# Patient Record
Sex: FEMALE | URBAN - METROPOLITAN AREA
[De-identification: names, ages, dates, MRNs, and addresses within clinical notes are randomized per-mention and may not be internally consistent; named-entity substitution may affect disease eponyms.]

---

## 2022-03-28 ENCOUNTER — REFRACTIVE (OUTPATIENT)
Dept: URBAN - METROPOLITAN AREA CLINIC 37 | Facility: CLINIC | Age: 27
End: 2022-03-28

## 2022-03-28 DIAGNOSIS — H52.13 MYOPIA, BILATERAL: Primary | ICD-10-CM

## 2022-03-28 RX ORDER — OFLOXACIN 3 MG/ML
0.3 % SOLUTION/ DROPS OPHTHALMIC
Qty: 5 | Refills: 0 | Status: ACTIVE
Start: 2022-03-28

## 2022-03-28 RX ORDER — PREDNISOLONE ACETATE 10 MG/ML
1 % SUSPENSION/ DROPS OPHTHALMIC
Qty: 5 | Refills: 0 | Status: ACTIVE
Start: 2022-03-28

## 2022-03-28 ASSESSMENT — VISUAL ACUITY
OD: 20/20
OS: 20/20

## 2022-03-28 ASSESSMENT — KERATOMETRY
OD: 43.55
OS: 43.60

## 2022-03-28 ASSESSMENT — INTRAOCULAR PRESSURE
OS: 17
OD: 17

## 2022-04-06 ENCOUNTER — REFRACTIVE (OUTPATIENT)
Dept: URBAN - METROPOLITAN AREA CLINIC 37 | Facility: CLINIC | Age: 27
End: 2022-04-06

## 2022-04-06 PROCEDURE — LASIK LASIK PRKSURGEON'S FEE: CUSTOM | Performed by: OPHTHALMOLOGY

## 2022-04-06 PROCEDURE — LASIK LASIK SURGEON'S FEE: CUSTOM | Performed by: OPHTHALMOLOGY

## 2022-04-09 ENCOUNTER — POST-OPERATIVE VISIT (OUTPATIENT)
Dept: URBAN - METROPOLITAN AREA CLINIC 10 | Facility: CLINIC | Age: 27
End: 2022-04-09

## 2022-04-09 DIAGNOSIS — Z48.810 ENCOUNTER FOR SURGICAL AFTERCARE FOLLOWING SURGERY ON A SENSE ORGAN: Primary | ICD-10-CM

## 2022-04-09 PROCEDURE — 99024 POSTOP FOLLOW-UP VISIT: CPT | Performed by: OPTOMETRIST

## 2022-04-09 NOTE — IMPRESSION/PLAN
Impression:  Encounter for surgical aftercare following surgery on a sense organ  Z48.810. Excellent post op course Plan: Continue pred / ofloxacin ou as directed Increase afts.